# Patient Record
Sex: MALE
[De-identification: names, ages, dates, MRNs, and addresses within clinical notes are randomized per-mention and may not be internally consistent; named-entity substitution may affect disease eponyms.]

---

## 2018-07-05 ENCOUNTER — HOSPITAL ENCOUNTER (EMERGENCY)
Dept: HOSPITAL 42 - ED | Age: 58
LOS: 1 days | Discharge: HOME | End: 2018-07-06
Payer: SELF-PAY

## 2018-07-05 VITALS — BODY MASS INDEX: 33.4 KG/M2

## 2018-07-05 DIAGNOSIS — Z23: ICD-10-CM

## 2018-07-05 DIAGNOSIS — T15.91XA: Primary | ICD-10-CM

## 2018-07-05 DIAGNOSIS — I10: ICD-10-CM
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
## 2018-07-05 PROCEDURE — 99284 EMERGENCY DEPT VISIT MOD MDM: CPT

## 2018-07-05 PROCEDURE — 90471 IMMUNIZATION ADMIN: CPT

## 2018-07-05 PROCEDURE — 90715 TDAP VACCINE 7 YRS/> IM: CPT

## 2018-07-05 PROCEDURE — 96372 THER/PROPH/DIAG INJ SC/IM: CPT

## 2018-07-06 VITALS
DIASTOLIC BLOOD PRESSURE: 71 MMHG | SYSTOLIC BLOOD PRESSURE: 148 MMHG | HEART RATE: 78 BPM | OXYGEN SATURATION: 98 % | RESPIRATION RATE: 17 BRPM | TEMPERATURE: 98 F

## 2018-07-06 NOTE — ED PDOC
Arrival/HPI





- General


Chief Complaint: Eye Problem


Time Seen by Provider: 07/06/18 00:07


Historian: Patient





- History of Present Illness


Narrative History of Present Illness (Text): 





07/06/18 01:54


58-year-old male presents today with foreign body sensation in the right eye. 

Patient states he was sanding paint off of a car at around 3 PM he felt foreign 

body go into the eye. Patient states his eye wasn't bothering at that time and 

he continued working.  Patient states tonight when he got out of the shower he 

had a severe burning pain in the right eye with photophobia. Patient states he'

s been rubbing his eye a lot. He denies headache dizziness or weakness. No 

medications have been taken for pain at home. Patient states this has happened 

to him once in the past. Patient is unsure of his last tetanus shot. No other 

complaints


Time/Duration: Other (3pm)


Symptom Onset: Gradual


Symptom Course: Worsening


Quality: Burning


Severity Level: 9





Past Medical History





- Provider Review


Nursing Documentation Reviewed: Yes





- Travel History


Have you recently traveled outside [place] w/in the past 3 mons?: No





- Infectious Disease


Hx of Infectious Diseases: None





- Tetanus Immunization


Tetanus Immunization: Unknown





- Cardiac


Hx Hypertension: Yes





- Pulmonary


Hx Respiratory Disorders: No





- Neurological


Hx Neurological Disorder: No





- HEENT


Hx HEENT Disorder: No





- Renal


Hx Renal Disorder: No





- Endocrine/Metabolic


Hx Endocrine Disorders: No





- Hematological/Oncological


Hx Blood Disorders: No





- Integumentary


Hx Dermatological Disorder: No





- Musculoskeletal/Rheumatological


Hx Musculoskeletal Disorders: No





- Psychiatric


Hx Depression: No


Hx Emotional Abuse: No


Hx Physical Abuse: No


Hx Substance Use: No





- Past Surgical History


Past Surgical History: No Previous





- Anesthesia


Hx Anesthesia: No





- Suicidal Assessment


Feels Threatened In Home Enviroment: No





Family/Social History





- Physician Review


Nursing Documentation Reviewed: Yes


Family/Social History: Unknown Family HX


Smoking Status: Never Smoked


Hx Alcohol Use: No


Hx Substance Use: No


Hx Substance Use Treatment: No





Allergies/Home Meds


Allergies/Adverse Reactions: 


Allergies





No Known Allergies Allergy (Verified 07/05/18 23:40)


 








Home Medications: 


 Home Meds











 Medication  Instructions  Recorded  Confirmed


 


Atenolol [Atenolol] 50 mg PO DAILY 07/05/18 07/05/18














Review of Systems





- Review of Systems


Constitutional: absent: Fatigue, Fevers


Eyes: Photophobia, Eye Pain


ENT: absent: Sore Throat, Sinus Congestion


Respiratory: absent: SOB, Cough


Cardiovascular: absent: Chest Pain, Palpitations


Gastrointestinal: absent: Abdominal Pain, Nausea, Vomiting


Genitourinary Male: absent: Dysuria


Musculoskeletal: absent: Arthralgias, Back Pain, Neck Pain


Skin: absent: Rash, Pruritis


Neurological: absent: Headache, Dizziness


Psychiatric: absent: Anxiety, Depression





Physical Exam


Vital Signs Reviewed: Yes


Vital Signs











  Temp Pulse Resp BP Pulse Ox


 


 07/06/18 02:12  98.0 F  78  17  148/71  98


 


 07/05/18 23:36  98.6 F  74  18  147/75  96











Temperature: Afebrile


Blood Pressure: Normal


Pulse: Regular


Respiratory Rate: Normal


Appearance: Positive for: Well-Appearing, Non-Toxic, Comfortable


Pain Distress: None


Mental Status: Positive for: Alert and Oriented X 3





- Systems Exam


Head: Present: Atraumatic


Pupils: Present: PERRL


Extroacular Muscles: Present: EOMI.  No: Entrapment


Conjunctiva: Present: Injected (right conjunctival injection. ), Other (+ 2 

small 1mm areas of dye uptake on the cornea; no hyphema, no subconjunctival 

hemorrhage.)


Mouth: Present: Moist Mucous Membranes


Pharnyx: Present: Normal


Neck: Present: Normal Range of Motion


Respiratory/Chest: Present: Clear to Auscultation


Cardiovascular: Present: Regular Rate and Rhythm


Neurological: Present: GCS=15


Skin: Present: Warm, Dry, Normal Color.  No: Rashes


Psychiatric: Present: Alert, Oriented x 3





Medical Decision Making


ED Course and Treatment: 





07/06/18 01:58


Patient is nontoxic well appearing in no distress





Visual acuity within normal limits





Tetanus updated


Toradol given for pain





Right eye: Positive Conjunctival injection noted,  PERRLA,  extraocular muscles 

intact, +2 small 1 mm in size areas of dye uptake concerning for possible small 

foreign body/corneal abrasion.





2 calls were placed to the ophthalmologist; 








All results discussed this with the patient. Patient was advised to follow-up 

with the ophthalmologist tomorrow. Advised patient of possible foreign body in 

the eye/possible corneal abrasion. Stressed importance of immediate return is 

symptoms worsen persist or if new concerning symptoms








Patient verbalizes understanding of discharge instructions and need for 

immediate followup.








all aspects of this case were discussed the attending of record. 








Impression: Foreign body, eye


Motrin every 6 hours as needed for pain


Tobrex: 2 drops in the affected eye 4 times daily


Followup with the eye doctor tomorrow


Return immediately if symptoms worsen persist or if new symptoms develop; 

blurry vision, worsening eye pain, worsening redness or any other concerning 

symptoms develop.


Follow up with the primary care physician within the next 2 days








Reassessment Condition: Re-examined, Improved





- Medication Orders


Current Medication Orders: 











Discontinued Medications





Ketorolac Tromethamine (Toradol)  60 mg IM STAT STA


   Stop: 07/06/18 01:05


   Last Admin: 07/06/18 01:19  Dose: 60 mg





MAR Pain Assessment


 Document     07/06/18 01:19  IT  (Rec: 07/06/18 01:19  IT  [facility] [place]-[id number])


     Pain Reassessment


      Is this a pain reassessment?               No


     Sleep


      Is patient sleeping during reassessment?   No


     Presence of Pain


      Presence of Pain                           Yes


     Pain Scale Used


      Pain Scale Used                            Numeric


     Location


      Left, Right or Bilateral                   Right


      Pain Location Body Site                    Eye


IM Administration Charges


 Document     07/06/18 01:19  IT  (Rec: 07/06/18 01:19  IT  [facility] [place]-[id number])


     Injection Site


      MAR Injection Site                         Left Deltoid


     Charges for Administration


      # of IM Administrations                    1





Tetanus/Reduced Diphtheria/Acell Pertussis (Boostrix Vaccine Inj)  0.5 ml IM 

.ONCE ONE


   Stop: 07/06/18 01:05


   Last Admin: 07/06/18 01:18  Dose: 0.5 ml





MAR Immunization Data


 Document     07/06/18 01:18  IT  (Rec: 07/06/18 01:18  IT  [facility] [place]-[id number])


     Immunization Data


      Vaccine Information Sheet Given            Yes





Tetracaine HCl (Tetracaine 0.5% Ophth Soln)  1 drop OD STAT STA


   Stop: 07/06/18 00:47


   Last Admin: 07/06/18 00:53  Dose: 1 drop


   Comments: administered by Humera HARMON





Tobramycin Sulfate (Tobrex 0.3% Ophth Soln)  2 drop OD STAT STA


   Stop: 07/06/18 01:56


   Last Admin: 07/06/18 02:10  Dose: 2 drop











Disposition/Present on Arrival





- Present on Arrival


Any Indicators Present on Arrival: No


History of DVT/PE: No


History of Uncontrolled Diabetes: No


Urinary Catheter: No


History of Decub. Ulcer: No


History Surgical Site Infection Following: None





- Disposition


Have Diagnosis and Disposition been Completed?: Yes


Diagnosis: 


 Foreign body, eye





Disposition: HOME/ ROUTINE


Disposition Time: 01:25


Patient Plan: Discharge


Condition: GOOD


Discharge Instructions (ExitCare):  Foreign Body in Eye (DC)


Additional Instructions: 


Motrin every 6 hours as needed for pain


Tobrex: 2 drops in the affected eye 4 times daily


Followup with the eye doctor tomorrow


Return immediately if symptoms worsen persist or if new symptoms develop; 

blurry vision, worsening eye pain, worsening redness or any other concerning 

symptoms develop.


Follow up with the primary care physician within the next 2 days


Prescriptions: 


Ibuprofen [Motrin] 600 mg PO Q6H PRN #20 tab


 PRN Reason: pain/fever reduction


Tobramycin 0.3% [Tobramycin 5 Ml] 2 drop OD QID #1 bottle


Referrals: 


Aron Thurman MD [Staff Provider] - Follow up with primary


Porfirio Martinez MD [Staff Provider] - Follow up with primary


Chuck Hill [Staff Provider] - Follow up with primary


Forms:  [employer] (English), WORK NOTE